# Patient Record
Sex: FEMALE | Race: BLACK OR AFRICAN AMERICAN | NOT HISPANIC OR LATINO | Employment: OTHER | URBAN - METROPOLITAN AREA
[De-identification: names, ages, dates, MRNs, and addresses within clinical notes are randomized per-mention and may not be internally consistent; named-entity substitution may affect disease eponyms.]

---

## 2017-07-23 ENCOUNTER — APPOINTMENT (OUTPATIENT)
Dept: RADIOLOGY | Facility: MEDICAL CENTER | Age: 62
End: 2017-07-23
Attending: EMERGENCY MEDICINE
Payer: COMMERCIAL

## 2017-07-23 ENCOUNTER — HOSPITAL ENCOUNTER (EMERGENCY)
Facility: MEDICAL CENTER | Age: 62
End: 2017-07-23
Attending: EMERGENCY MEDICINE
Payer: COMMERCIAL

## 2017-07-23 VITALS
HEART RATE: 59 BPM | TEMPERATURE: 99.2 F | RESPIRATION RATE: 11 BRPM | DIASTOLIC BLOOD PRESSURE: 81 MMHG | WEIGHT: 263.01 LBS | OXYGEN SATURATION: 65 % | SYSTOLIC BLOOD PRESSURE: 155 MMHG

## 2017-07-23 DIAGNOSIS — N30.00 ACUTE CYSTITIS WITHOUT HEMATURIA: ICD-10-CM

## 2017-07-23 DIAGNOSIS — K57.30 DIVERTICULOSIS OF LARGE INTESTINE WITHOUT HEMORRHAGE: ICD-10-CM

## 2017-07-23 LAB
ALBUMIN SERPL BCP-MCNC: 3.8 G/DL (ref 3.2–4.9)
ALBUMIN/GLOB SERPL: 1.3 G/DL
ALP SERPL-CCNC: 57 U/L (ref 30–99)
ALT SERPL-CCNC: 11 U/L (ref 2–50)
ANION GAP SERPL CALC-SCNC: 6 MMOL/L (ref 0–11.9)
APPEARANCE UR: CLEAR
APTT PPP: 29.4 SEC (ref 24.7–36)
AST SERPL-CCNC: 13 U/L (ref 12–45)
BACTERIA #/AREA URNS HPF: ABNORMAL /HPF
BASOPHILS # BLD AUTO: 0.7 % (ref 0–1.8)
BASOPHILS # BLD: 0.05 K/UL (ref 0–0.12)
BILIRUB SERPL-MCNC: 0.3 MG/DL (ref 0.1–1.5)
BILIRUB UR QL STRIP.AUTO: NEGATIVE
BNP SERPL-MCNC: 30 PG/ML (ref 0–100)
BUN SERPL-MCNC: 11 MG/DL (ref 8–22)
CALCIUM SERPL-MCNC: 8.9 MG/DL (ref 8.5–10.5)
CHLORIDE SERPL-SCNC: 108 MMOL/L (ref 96–112)
CO2 SERPL-SCNC: 27 MMOL/L (ref 20–33)
COLOR UR: YELLOW
CREAT SERPL-MCNC: 0.69 MG/DL (ref 0.5–1.4)
CULTURE IF INDICATED INDCX: YES UA CULTURE
EKG IMPRESSION: NORMAL
EOSINOPHIL # BLD AUTO: 0.23 K/UL (ref 0–0.51)
EOSINOPHIL NFR BLD: 3.3 % (ref 0–6.9)
EPI CELLS #/AREA URNS HPF: ABNORMAL /HPF
ERYTHROCYTE [DISTWIDTH] IN BLOOD BY AUTOMATED COUNT: 51.9 FL (ref 35.9–50)
GFR SERPL CREATININE-BSD FRML MDRD: >60 ML/MIN/1.73 M 2
GLOBULIN SER CALC-MCNC: 2.9 G/DL (ref 1.9–3.5)
GLUCOSE SERPL-MCNC: 95 MG/DL (ref 65–99)
GLUCOSE UR STRIP.AUTO-MCNC: NEGATIVE MG/DL
HCT VFR BLD AUTO: 36.3 % (ref 37–47)
HGB BLD-MCNC: 11.5 G/DL (ref 12–16)
IMM GRANULOCYTES # BLD AUTO: 0.02 K/UL (ref 0–0.11)
IMM GRANULOCYTES NFR BLD AUTO: 0.3 % (ref 0–0.9)
INR PPP: 1.1 (ref 0.87–1.13)
KETONES UR STRIP.AUTO-MCNC: NEGATIVE MG/DL
LEUKOCYTE ESTERASE UR QL STRIP.AUTO: ABNORMAL
LIPASE SERPL-CCNC: 19 U/L (ref 11–82)
LYMPHOCYTES # BLD AUTO: 2.95 K/UL (ref 1–4.8)
LYMPHOCYTES NFR BLD: 41.8 % (ref 22–41)
MCH RBC QN AUTO: 26.7 PG (ref 27–33)
MCHC RBC AUTO-ENTMCNC: 31.7 G/DL (ref 33.6–35)
MCV RBC AUTO: 84.2 FL (ref 81.4–97.8)
MICRO URNS: ABNORMAL
MONOCYTES # BLD AUTO: 0.52 K/UL (ref 0–0.85)
MONOCYTES NFR BLD AUTO: 7.4 % (ref 0–13.4)
MUCOUS THREADS #/AREA URNS HPF: ABNORMAL /HPF
NEUTROPHILS # BLD AUTO: 3.28 K/UL (ref 2–7.15)
NEUTROPHILS NFR BLD: 46.5 % (ref 44–72)
NITRITE UR QL STRIP.AUTO: POSITIVE
NRBC # BLD AUTO: 0 K/UL
NRBC BLD AUTO-RTO: 0 /100 WBC
PH UR STRIP.AUTO: 5.5 [PH]
PLATELET # BLD AUTO: 366 K/UL (ref 164–446)
PMV BLD AUTO: 10.1 FL (ref 9–12.9)
POTASSIUM SERPL-SCNC: 3.6 MMOL/L (ref 3.6–5.5)
PROT SERPL-MCNC: 6.7 G/DL (ref 6–8.2)
PROT UR QL STRIP: NEGATIVE MG/DL
PROTHROMBIN TIME: 14.6 SEC (ref 12–14.6)
RBC # BLD AUTO: 4.31 M/UL (ref 4.2–5.4)
RBC # URNS HPF: ABNORMAL /HPF
RBC UR QL AUTO: ABNORMAL
SODIUM SERPL-SCNC: 141 MMOL/L (ref 135–145)
SP GR UR STRIP.AUTO: 1.03
TROPONIN I SERPL-MCNC: <0.01 NG/ML (ref 0–0.04)
UROBILINOGEN UR STRIP.AUTO-MCNC: 1 MG/DL
WBC # BLD AUTO: 7.1 K/UL (ref 4.8–10.8)
WBC #/AREA URNS HPF: ABNORMAL /HPF

## 2017-07-23 PROCEDURE — A9270 NON-COVERED ITEM OR SERVICE: HCPCS | Performed by: EMERGENCY MEDICINE

## 2017-07-23 PROCEDURE — 74176 CT ABD & PELVIS W/O CONTRAST: CPT

## 2017-07-23 PROCEDURE — 87077 CULTURE AEROBIC IDENTIFY: CPT

## 2017-07-23 PROCEDURE — 83880 ASSAY OF NATRIURETIC PEPTIDE: CPT

## 2017-07-23 PROCEDURE — 80053 COMPREHEN METABOLIC PANEL: CPT

## 2017-07-23 PROCEDURE — 85025 COMPLETE CBC W/AUTO DIFF WBC: CPT

## 2017-07-23 PROCEDURE — 96372 THER/PROPH/DIAG INJ SC/IM: CPT

## 2017-07-23 PROCEDURE — 81001 URINALYSIS AUTO W/SCOPE: CPT

## 2017-07-23 PROCEDURE — 36415 COLL VENOUS BLD VENIPUNCTURE: CPT

## 2017-07-23 PROCEDURE — 93005 ELECTROCARDIOGRAM TRACING: CPT

## 2017-07-23 PROCEDURE — 87186 SC STD MICRODIL/AGAR DIL: CPT

## 2017-07-23 PROCEDURE — 71010 DX-CHEST-LIMITED (1 VIEW): CPT

## 2017-07-23 PROCEDURE — 99284 EMERGENCY DEPT VISIT MOD MDM: CPT

## 2017-07-23 PROCEDURE — 700102 HCHG RX REV CODE 250 W/ 637 OVERRIDE(OP): Performed by: EMERGENCY MEDICINE

## 2017-07-23 PROCEDURE — 87086 URINE CULTURE/COLONY COUNT: CPT

## 2017-07-23 PROCEDURE — 84484 ASSAY OF TROPONIN QUANT: CPT

## 2017-07-23 PROCEDURE — 85610 PROTHROMBIN TIME: CPT

## 2017-07-23 PROCEDURE — 85730 THROMBOPLASTIN TIME PARTIAL: CPT

## 2017-07-23 PROCEDURE — 83690 ASSAY OF LIPASE: CPT

## 2017-07-23 PROCEDURE — 700111 HCHG RX REV CODE 636 W/ 250 OVERRIDE (IP): Performed by: EMERGENCY MEDICINE

## 2017-07-23 RX ORDER — TRAMADOL HYDROCHLORIDE 50 MG/1
50 TABLET ORAL EVERY 4 HOURS PRN
Qty: 30 TAB | Refills: 0 | Status: SHIPPED | OUTPATIENT
Start: 2017-07-23

## 2017-07-23 RX ORDER — KETOROLAC TROMETHAMINE 30 MG/ML
30 INJECTION, SOLUTION INTRAMUSCULAR; INTRAVENOUS ONCE
Status: COMPLETED | OUTPATIENT
Start: 2017-07-23 | End: 2017-07-23

## 2017-07-23 RX ORDER — CEFDINIR 300 MG/1
300 CAPSULE ORAL 2 TIMES DAILY
Qty: 20 CAP | Refills: 0 | Status: SHIPPED | OUTPATIENT
Start: 2017-07-23 | End: 2017-08-02

## 2017-07-23 RX ORDER — CEFDINIR 300 MG/1
300 CAPSULE ORAL ONCE
Status: COMPLETED | OUTPATIENT
Start: 2017-07-23 | End: 2017-07-23

## 2017-07-23 RX ADMIN — KETOROLAC TROMETHAMINE 30 MG: 30 INJECTION, SOLUTION INTRAMUSCULAR; INTRAVENOUS at 16:52

## 2017-07-23 RX ADMIN — CEFDINIR 300 MG: 300 CAPSULE ORAL at 20:49

## 2017-07-23 ASSESSMENT — PAIN SCALES - GENERAL
PAINLEVEL_OUTOF10: 6
PAINLEVEL_OUTOF10: 8
PAINLEVEL_OUTOF10: 7

## 2017-07-23 ASSESSMENT — LIFESTYLE VARIABLES: DO YOU DRINK ALCOHOL: NO

## 2017-07-23 NOTE — ED AVS SNAPSHOT
7/23/2017    Susi Qiu  [address] [place] [address]    Dear Susi:    [facility] wants to ensure your discharge home is safe and you or your loved ones have had all of your questions answered regarding your care after you leave the hospital.    Below is a list of resources and contact information should you have any questions regarding your hospital stay, follow-up instructions, or active medical symptoms.    Questions or Concerns Regarding… Contact   Medical Questions Related to Your Discharge  (7 days a week, 8am-5pm) Contact a Nurse Care Coordinator   [phone]   Medical Questions Not Related to Your Discharge  (24 hours a day / 7 days a week)  Contact the Nurse Health Line   [phone]    Medications or Discharge Instructions Refer to your discharge packet   or contact your [facility] Primary Care Provider   [phone]   Follow-up Appointment(s) Schedule your appointment via Datto   or contact Scheduling [phone]   Billing Review your statement via Datto  or contact Billing [phone]   Medical Records Review your records via Datto   or contact Medical Records [phone]     You may receive a telephone call within two days of discharge. This call is to make certain you understand your discharge instructions and have the opportunity to have any questions answered. You can also easily access your medical information, test results and upcoming appointments via the Datto free online health management tool. You can learn more and sign up at [employer]/Datto. For assistance setting up your Datto account, please call [phone].    Once again, we want to ensure your discharge home is safe and that you have a clear understanding of any next steps in your care. If you have any questions or concerns, please do not hesitate to contact us, we are here for you. Thank you for choosing [facility] for your healthcare needs.    Sincerely,    Your [facility] Healthcare Team

## 2017-07-23 NOTE — ED NOTES
Amb to triage w/ c/o low abd pain, back pain radiating down her RLE x 3-4 days.  Pt also c/o substernal chest pain radiating down her RLE since this a.m.

## 2017-07-23 NOTE — ED AVS SNAPSHOT
Cloudbuild Access Code: [email or web address]  Expires: 8/22/2017  8:11 PM    Your email address is not on file at [facility].  Email Addresses are required for you to sign up for Cloudbuild, please contact [phone] to verify your personal information and to provide your email address prior to attempting to register for Cloudbuild[place] [address]    Cloudbuild  A secure, online tool to manage your health information     [facility]’s Cloudbuild® is a secure, online tool that connects you to your personalized health information from the privacy of your home -- day or night - making it very easy for you to manage your healthcare. Once the activation process is completed, you can even access your medical information using the Cloudbuild mahogany, which is available for free in the Apple Mahogany store or Google Play store.     To learn more about Cloudbuild, visit [email or web address]    There are two levels of access available (as shown below):   My Chart Features  [facility] Primary Care Doctor [facility]  Specialists [facility]  Urgent  Care Non-[facility] Primary Care Doctor   Email your healthcare team securely and privately 24/7 X X X    Manage appointments: schedule your next appointment; view details of past/upcoming appointments X      Request prescription refills. X      View recent personal medical records, including lab and immunizations X X X X   View health record, including health history, allergies, medications X X X X   Read reports about your outpatient visits, procedures, consult and ER notes X X X X   See your discharge summary, which is a recap of your hospital and/or ER visit that includes your diagnosis, lab results, and care plan X X  X     How to register for [employer]:  Once your e-mail address has been verified, follow the following steps to sign up for Cloudbuild.     1. Go to  [email or web address]  2. Click on the Sign Up Now box, which takes you to the New Member Sign Up page. You will  need to provide the following information:  a. Enter your [employer] Access Code exactly as it appears at the top of this page. (You will not need to use this code after you’ve completed the sign-up process. If you do not sign up before the expiration date, you must request a new code.)   b. Enter your date of birth.   c. Enter your home email address.   d. Click Submit, and follow the next screen’s instructions.  3. Create a [employer]t ID. This will be your [employer] login ID and cannot be changed, so think of one that is secure and easy to remember.  4. Create a [employer] password. You can change your password at any time.  5. Enter your Password Reset Question and Answer. This can be used at a later time if you forget your password.   6. Enter your e-mail address. This allows you to receive e-mail notifications when new information is available in [employer]  7. Click Sign Up. You can now view your health information.    For assistance activating your [employer] account, call [phone]

## 2017-07-23 NOTE — ED AVS SNAPSHOT
Home Care Instructions                                                                                                                Susi Qiu   MRN: [id number]    Department:  [facility], Emergency Dept   Date of Visit:  7/23/2017            [facility], Emergency Dept    [address] [place] [id number]    Phone:  [phone]      You were seen by     Ngozi Perez M.D.      Your Diagnosis Was     Acute cystitis without hematuria     N30.00       These are the medications you received during your hospitalization from 07/23/2017 1318 to 07/23/2017 2011     Date/Time Order Dose Route Action    07/23/2017 1652 ketorolac (TORADOL) injection 30 mg 30 mg Intramuscular Given      Follow-up Information     1. Follow up with [facility], Emergency Dept.    Specialty:  Emergency Medicine    Why:  Return for worsening pain, fever, vomiting or other concerns    Contact information    [address] [place] [address]  [place] [address] [phone]      Medication Information     Review all of your home medications and newly ordered medications with your primary doctor and/or pharmacist as soon as possible. Follow medication instructions as directed by your doctor and/or pharmacist.     Please keep your complete medication list with you and share with your physician. Update the information when medications are discontinued, doses are changed, or new medications (including over-the-counter products) are added; and carry medication information at all times in the event of emergency situations.               Medication List      START taking these medications        Instructions    Morning Afternoon Evening Bedtime    cefdinir 300 MG Caps   Commonly known as:  OMNICEF        Take 1 Cap by mouth 2 times a day for 10 days.   Dose:  300 mg                        tramadol 50 MG Tabs   Commonly known as:  ULTRAM        Take 1 Tab by mouth every four hours as needed.      Dose:  50 mg                             Where to Get Your Medications      You can get these medications from any pharmacy     Bring a paper prescription for each of these medications    - cefdinir 300 MG Caps  - tramadol 50 MG Tabs            Procedures and tests performed during your visit     Procedure/Test Number of Times Performed    APTT 1    Btype Natriuretic Peptide 1    CARDIAC MONITORING 1    CBC with Differential 1    CT-ABDOMEN-PELVIS W/O 1    Cardiac Monitoring 1    Complete Metabolic Panel (CMP) 1    DX-CHEST-LIMITED (1 VIEW) 1    EKG (ER) 2    ESTIMATED GFR 1    Lipase 1    Maintain O2 sats greater than 94% 1    O2 Protocol 1    Oxygen Therapy per Protocol 1    Prothrombin Time 1    Troponin 1    URINALYSIS,CULTURE IF INDICATED 1    URINE MICROSCOPIC (W/UA) 1        Discharge Instructions       Urinary Tract Infection  A urinary tract infection (UTI) can occur any place along the urinary tract. The tract includes the kidneys, ureters, bladder, and urethra. A type of germ called bacteria often causes a UTI. UTIs are often helped with antibiotic medicine.   HOME CARE   · If given, take antibiotics as told by your doctor. Finish them even if you start to feel better.  · Drink enough fluids to keep your pee (urine) clear or pale yellow.  · Avoid tea, drinks with caffeine, and bubbly (carbonated) drinks.  · Pee often. Avoid holding your pee in for a long time.  · Pee before and after having sex (intercourse).  · Wipe from front to back after you poop (bowel movement) if you are a woman. Use each tissue only once.  GET HELP RIGHT AWAY IF:   · You have back pain.  · You have lower belly (abdominal) pain.  · You have chills.  · You feel sick to your stomach (nauseous).  · You throw up (vomit).  · Your burning or discomfort with peeing does not go away.  · You have a fever.  · Your symptoms are not better in 3 days.  MAKE SURE YOU:   · Understand these instructions.  · Will watch your condition.  · Will get  help right away if you are not doing well or get worse.     This information is not intended to replace advice given to you by your health care provider. Make sure you discuss any questions you have with your health care provider.     Document Released: 06/05/2009 Document Revised: 01/08/2016 Document Reviewed: 07/18/2013  [employer] Interactive Patient Education ©2016 [employer]    Diverticulosis  Diverticulosis is a common condition that develops when small pouches (diverticula) form in the wall of the colon. The risk of diverticulosis increases with age. It happens more often in people who eat a low-fiber diet. Most individuals with diverticulosis have no symptoms. Those individuals with symptoms usually experience abdominal pain, constipation, or diarrhea.   Eating a high-fiber diet has been shown to reduce discomfort and other symptoms of diverticulosis. Fiber may also slow the progression of diverticulosis. Foods having high fiber content include:  · Baked beans, kidney beans, split peas, lentils.  · Bran cereals, shredded wheat, bran muffins, whole-grain breads.  · Fresh fruit, raisins, prunes.  · Potatoes (with skin), broccoli, spinach, zucchini.  You may feel a little bloated when you introduce more fiber into your diet. These symptoms usually pass in time. Drink enough water and fluids to keep your urine clear or pale yellow, to prevent constipation. Try not to strain when you have a bowel movement. Some caregivers may recommend avoiding nuts and seeds to prevent complications of diverticulosis.  Mild pain medicine may help soothe pain and spasms. Only take over-the-counter or prescription medicines for pain, discomfort, or fever as directed by your caregiver.  Some complications of diverticulosis include an infection of the diverticula (diverticulitis), rectal bleeding, or blockage of the colon (bowel obstruction).  SEEK IMMEDIATE MEDICAL CARE IF:   · You develop increasing pain or severe  bloating.  · You have an oral temperature above 102° F (38.9° C), not controlled by medicine.  · You develop vomiting or bowel movements that are bloody or black.  Document Released: 12/18/2006 Document Revised: 03/11/2013 Document Reviewed: 05/18/2011  ExitCare® Patient Information ©2014 [employer].            Patient Information     Patient Information    Following emergency treatment: all patient requiring follow-up care must return either to a private physician or a clinic if your condition worsens before you are able to obtain further medical attention, please return to the emergency room.     Billing Information    At [facility], we work to make the billing process streamlined for our patients.  Our Representatives are here to answer any questions you may have regarding your hospital bill.  If you have insurance coverage and have supplied your insurance information to us, we will submit a claim to your insurer on your behalf.  Should you have any questions regarding your bill, we can be reached online or by phone as follows:  Online: You are able pay your bills online or live chat with our representatives about any billing questions you may have. We are here to help Monday - Friday from 8:00am to 7:30pm and 9:00am - 12:00pm on Saturdays.  Please visit [email or web address]  for more information.   Phone:  [phone] or [phone]    Please note that your emergency physician, surgeon, pathologist, radiologist, anesthesiologist, and other specialists are not employed by [facility] and will therefore bill separately for their services.  Please contact them directly for any questions concerning their bills at the numbers below:     Emergency Physician Services:  [phone]  [facility]:  [phone]  [facility]:  [phone]  [facility]:  [phone]    1. Your final bill may vary from the amount quoted upon discharge  if all procedures are not complete at that time, or if your doctor has additional procedures of which we are not aware. You will receive an additional bill if you return to the Emergency Department at [facility] for suture removal regardless of the facility of which the sutures were placed.     2. Please arrange for settlement of this account at the emergency registration.    3. All self-pay accounts are due in full at the time of treatment.  If you are unable to meet this obligation then payment is expected within 4-5 days.     4. If you have had radiology studies (CT, X-ray, Ultrasound, MRI), you have received a preliminary result during your emergency department visit. Please contact the radiology department [phone] to receive a copy of your final result. Please discuss the Final result with your primary physician or with the follow up physician provided.     Crisis Hotline:  [place] Crisis Hotline:  2-774-KVXJKDY or 1-343.734.2745  [place] Crisis Hotline:    1-847.820.3830 or 146-210-1261         ED Discharge Follow Up Questions    1. In order to provide you with very good care, we would like to follow up with a phone call in the next few days.  May we have your permission to contact you?     YES /  NO    2. What is the best phone number to call you? (       )_____-__________    3. What is the best time to call you?      Morning  /  Afternoon  /  Evening                   Patient Signature:  ____________________________________________________________    Date:  ____________________________________________________________

## 2017-07-23 NOTE — ED PROVIDER NOTES
ED Provider Note    Scribed for Ngozi Perez M.D. by Isael Brown. 7/23/2017, 4:37 PM.    Primary care provider: No primary care provider on file.  Means of arrival: Walk In  History obtained from: Patient  History limited by: None    CHIEF COMPLAINT  Chief Complaint   Patient presents with   • Abdominal Pain     low x 3 days   • Back Pain     hx of sciatica   • Leg Pain   • Chest Pain   • Arm Pain     right       HPI  Susi Qiu is a 61 y.o. female who presents to the Emergency Department complaining of worsening chest pain, onset 3 days ago. Patient notes pain radiates to her neck, back, and right arm. No exacerbating or alleviating factors noted. Additionally, patient is complaining of lower abdominal pain which radiates to her back and buttocks. Pain is described as burning in nature. Per patient, she has noticed a bump on her buttocks. Patient notes history of chronic endometriosis but has not experienced any symptoms for over 30 years.  Patient states she has taken Toradol in the past with improvement. Denies recent fevers or chills.      REVIEW OF SYSTEMS  Pertinent positives include chest pain, neck pain, back pain, right arm pain, abdominal pain. Pertinent negatives include no fevers or chills. All other systems reviewed and negative.    PAST MEDICAL HISTORY  Chronic endometriosis.   Sciatica     SURGICAL HISTORY  patient denies any surgical history    SOCIAL HISTORY  Social History   Substance Use Topics   • Smoking status: Never Smoker    • Smokeless tobacco: None   • Alcohol Use: No      FAMILY HISTORY  Noncontributory     CURRENT MEDICATIONS  No current facility-administered medications on file prior to encounter.     No current outpatient prescriptions on file prior to encounter.       ALLERGIES  Allergies   Allergen Reactions   • Iodine        PHYSICAL EXAM  VITAL SIGNS: /81 mmHg  Pulse 60  Temp(Src) 37.3 °C (99.2 °F) (Temporal)  Resp 18  Wt 119.3 kg (263 lb 0.1 oz)  SpO2  95%  Constitutional: Alert in no apparent distress.  HENT: No signs of trauma, Bilateral external ears normal, Nose normal.   Eyes: Pupils are equal and reactive, Conjunctiva normal, Non-icteric.   Neck: Normal range of motion, No tenderness, Supple, No stridor.   Cardiovascular: Regular rate and rhythm, no murmurs.   Thorax & Lungs: Normal breath sounds, No respiratory distress, No wheezing, No chest tenderness.   Abdomen: Bowel sounds normal, Soft, Left Lower quadrant tenderness, No masses, No peritoneal signs.  : Normal external lesions  Skin: Warm, Dry, No erythema, No rash.   Back: No bony tenderness, No CVA tenderness.   Musculoskeletal:  No major deformities noted. Reproducible pain of right trapezius.   Neurologic: Alert, moving all extremities without difficulty, no focal deficits.    LABS  Results for orders placed or performed during the hospital encounter of 07/23/17   Troponin   Result Value Ref Range    Troponin I <0.01 0.00 - 0.04 ng/mL   Btype Natriuretic Peptide   Result Value Ref Range    B Natriuretic Peptide 30 0 - 100 pg/mL   Prothrombin Time   Result Value Ref Range    PT 14.6 12.0 - 14.6 sec    INR 1.10 0.87 - 1.13   APTT   Result Value Ref Range    APTT 29.4 24.7 - 36.0 sec   CBC with Differential   Result Value Ref Range    WBC 7.1 4.8 - 10.8 K/uL    RBC 4.31 4.20 - 5.40 M/uL    Hemoglobin 11.5 (L) 12.0 - 16.0 g/dL    Hematocrit 36.3 (L) 37.0 - 47.0 %    MCV 84.2 81.4 - 97.8 fL    MCH 26.7 (L) 27.0 - 33.0 pg    MCHC 31.7 (L) 33.6 - 35.0 g/dL    RDW 51.9 (H) 35.9 - 50.0 fL    Platelet Count 366 164 - 446 K/uL    MPV 10.1 9.0 - 12.9 fL    Neutrophils-Polys 46.50 44.00 - 72.00 %    Lymphocytes 41.80 (H) 22.00 - 41.00 %    Monocytes 7.40 0.00 - 13.40 %    Eosinophils 3.30 0.00 - 6.90 %    Basophils 0.70 0.00 - 1.80 %    Immature Granulocytes 0.30 0.00 - 0.90 %    Nucleated RBC 0.00 /100 WBC    Neutrophils (Absolute) 3.28 2.00 - 7.15 K/uL    Lymphs (Absolute) 2.95 1.00 - 4.80 K/uL    Monos  (Absolute) 0.52 0.00 - 0.85 K/uL    Eos (Absolute) 0.23 0.00 - 0.51 K/uL    Baso (Absolute) 0.05 0.00 - 0.12 K/uL    Immature Granulocytes (abs) 0.02 0.00 - 0.11 K/uL    NRBC (Absolute) 0.00 K/uL   Complete Metabolic Panel (CMP)   Result Value Ref Range    Sodium 141 135 - 145 mmol/L    Potassium 3.6 3.6 - 5.5 mmol/L    Chloride 108 96 - 112 mmol/L    Co2 27 20 - 33 mmol/L    Anion Gap 6.0 0.0 - 11.9    Glucose 95 65 - 99 mg/dL    Bun 11 8 - 22 mg/dL    Creatinine 0.69 0.50 - 1.40 mg/dL    Calcium 8.9 8.5 - 10.5 mg/dL    AST(SGOT) 13 12 - 45 U/L    ALT(SGPT) 11 2 - 50 U/L    Alkaline Phosphatase 57 30 - 99 U/L    Total Bilirubin 0.3 0.1 - 1.5 mg/dL    Albumin 3.8 3.2 - 4.9 g/dL    Total Protein 6.7 6.0 - 8.2 g/dL    Globulin 2.9 1.9 - 3.5 g/dL    A-G Ratio 1.3 g/dL   Lipase   Result Value Ref Range    Lipase 19 11 - 82 U/L   ESTIMATED GFR   Result Value Ref Range    GFR If African American >60 >60 mL/min/1.73 m 2    GFR If Non African American >60 >60 mL/min/1.73 m 2   URINALYSIS,CULTURE IF INDICATED   Result Value Ref Range    Color Yellow     Character Clear     Specific Gravity 1.029 <1.035    Ph 5.5 5.0-8.0    Glucose Negative Negative mg/dL    Ketones Negative Negative mg/dL    Protein Negative Negative mg/dL    Bilirubin Negative Negative    Urobilinogen, Urine 1.0 Negative    Nitrite Positive (A) Negative    Leukocyte Esterase Trace (A) Negative    Occult Blood Moderate (A) Negative    Micro Urine Req Microscopic     Culture Indicated Yes UA Culture   URINE MICROSCOPIC (W/UA)   Result Value Ref Range    WBC 5-10 (A) /hpf    RBC 5-10 (A) /hpf    Bacteria Many (A) None /hpf    Epithelial Cells Moderate (A) /hpf    Mucous Threads Few /hpf   EKG (ER)   Result Value Ref Range    Report       [facility] Emergency Dept.    Test Date:  2017-07-23  Pt Name:    SOHEILA GOEL              Department: ER  MRN:        [id number]                      Room:  Gender:     F                             Technician: [id number]  :        1955                   Requested By:ER TRIAGE PROTOCOL  Order #:    [id number]                    Reading MD:    Measurements  Intervals                                Axis  Rate:       60                           P:          34  GA:         176                          QRS:        33  QRSD:       90                           T:          10  QT:         408  QTc:        408    Interpretive Statements  SINUS RHYTHM  No previous ECG available for comparison         All labs reviewed by me.    EKG  12 Lead EKG interpreted by me to show:  Normal sinus rhythm  Rate 60  Axis: Normal  Intervals: Normal  Normal T waves  Normal ST segments  My impression of this EKG: Does not indicate ischemia or arrythmia at this time.    RADIOLOGY  CT-ABDOMEN-PELVIS W/O   Final Result      Moderate colonic diverticulosis without definite diverticulitis      Medium-sized pericardial effusion      Right renal simple cyst      Probable left adrenal gland mass most likely is an adenoma although it is too small to characterize      DX-CHEST-LIMITED (1 VIEW)   Final Result         Cardiomegaly and mild pulmonary edema.        The radiologist's interpretation of all radiological studies have been reviewed by me.    COURSE & MEDICAL DECISION MAKING  Pertinent Labs & Imaging studies reviewed. (See chart for details)    Differential diagnoses include but are not limited to: Diverticulitis, UTI, fibroid.     4:37 PM - Patient seen and examined at bedside. Patient will be treated with 30 mg Toradol Injection. Ordered abdomen-pelvis CT, chest x-ray, estimated GFR, CBC with differential, CMP, lipase, POC UA, troponin, BNP, PTT/APTT to evaluate her symptoms.     6:31 PM - Reviewed radiology results.     7:03 PM - Recheck patient. Patient informed diagnostic studies are normal at this time but still awaiting UA.     7:58 PM - Reviewed lab results showing evidence of UTI.     8:09 PM - Recheck patient. Patient informed  she has UTI and will be discharged home with antibiotics. Informed I consulted with scheduling to establish her with PCP.   /81 mmHg  Pulse 59  Temp(Src) 37.3 °C (99.2 °F) (Temporal)  Resp 11  Wt 119.3 kg (263 lb 0.1 oz)  SpO2 65%      Decision Making:  This is a 61 y.o. year old female who presents with abdominal pain. Patient is afebrile she is not tachycardic. Her labs are essentially normal. She does not have any evidence of heart failure she has a normal BNP. CT scan was performed and she is diverticulosis without diverticulitis. She has some cardiomegaly as well on her x-ray. Again she has a normal troponin and normal BNP I do not think she is in heart failure. She does have evidence of a UTI. Positive nitrates. She'll be started on antibiotics. Scheduling has been called for primary care follow-up. She will be discharged.    The patient will return for new or worsening symptoms and is stable at the time of discharge.    The patient is referred to a primary physician for blood pressure management, diabetic screening, and for all other preventative health concerns.    DISPOSITION:  Patient will be discharged home in stable condition.    FOLLOW UP:  [facility], Emergency Dept  [address] [place] [id number] [phone]    Return for worsening pain, fever, vomiting or other concerns      OUTPATIENT MEDICATIONS:  Discharge Medication List as of 7/23/2017  8:11 PM      START taking these medications    Details   cefdinir (OMNICEF) 300 MG Cap Take 1 Cap by mouth 2 times a day for 10 days., Disp-20 Cap, R-0, Print Rx Paper      tramadol (ULTRAM) 50 MG Tab Take 1 Tab by mouth every four hours as needed., Disp-30 Tab, R-0, Print Rx Paper           FINAL IMPRESSION  1. Acute cystitis without hematuria    2. Diverticulosis of large intestine without hemorrhage         Your blood pressure is elevated here in the emergency department. Please monitor your blood pressure over the  next several days. If your blood pressure continues to be 120/80 or higher please contact your physician for blood pressure management.     This dictation has been created using voice recognition software and/or scribes. The accuracy of the dictation is limited by the abilities of the software and the expertise of the scribes. I expect there may be some errors of grammar and possibly content. I made every attempt to manually correct the errors within my dictation. However, errors related to voice recognition software and/or scribes may still exist and should be interpreted within the appropriate context.     Isael TRUJILLO (Scribe), am scribing for, and in the presence of, Ngozi Perez M.D..    Electronically signed by: Isael Brown (Scribe), 7/23/2017    Ngozi TRUJILLO M.D. personally performed the services described in this documentation, as scribed by Isael Brown in my presence, and it is both accurate and complete.    The note accurately reflects work and decisions made by me.  Ngozi Perez  7/23/2017  11:29 PM

## 2017-07-23 NOTE — LETTER
7/27/2017               Susi Becerraville  [address] [place] [id number]        Dear Susi (MR#[id number])    This letter is sent in regards to your, recent visit to the [facility] Emergency Department on 7/23/2017.  During the visit, tests were performed to assist the physician in a medical diagnosis.  A review of those tests requires that we notify you of the following:    Your urine culture was POSITIVE for a bacteria called Klebsiella pneumoniae. The antibiotic prescribed for you (cefdinir) should be active to treat this bacteria. IT IS IMPORTANT THAT YOU CONTINUE TAKING YOUR ANTIBIOTIC UNTIL IT IS FINISHED.      Please feel free to contact me at the number below if you have any questions or concerns. Thank you for your cooperation in the matter.    Sincerely,  ED Culture Follow-Up Staff  Amber Garcia, PharmD    [facility], Emergency Department  [address] [place] [id number] [phone] (ED Culture Line)  [phone]

## 2017-07-24 ENCOUNTER — PATIENT OUTREACH (OUTPATIENT)
Dept: HEALTH INFORMATION MANAGEMENT | Facility: OTHER | Age: 62
End: 2017-07-24

## 2017-07-24 NOTE — ED NOTES
.Discharge instructions given to pt. Pt verbalized understanding. Questions answered. Pt given 2 prescription. Ambulatory to home.   Awaiting medication from pharmacy before discharge.

## 2017-07-24 NOTE — DISCHARGE INSTRUCTIONS
Urinary Tract Infection  A urinary tract infection (UTI) can occur any place along the urinary tract. The tract includes the kidneys, ureters, bladder, and urethra. A type of germ called bacteria often causes a UTI. UTIs are often helped with antibiotic medicine.   HOME CARE   · If given, take antibiotics as told by your doctor. Finish them even if you start to feel better.  · Drink enough fluids to keep your pee (urine) clear or pale yellow.  · Avoid tea, drinks with caffeine, and bubbly (carbonated) drinks.  · Pee often. Avoid holding your pee in for a long time.  · Pee before and after having sex (intercourse).  · Wipe from front to back after you poop (bowel movement) if you are a woman. Use each tissue only once.  GET HELP RIGHT AWAY IF:   · You have back pain.  · You have lower belly (abdominal) pain.  · You have chills.  · You feel sick to your stomach (nauseous).  · You throw up (vomit).  · Your burning or discomfort with peeing does not go away.  · You have a fever.  · Your symptoms are not better in 3 days.  MAKE SURE YOU:   · Understand these instructions.  · Will watch your condition.  · Will get help right away if you are not doing well or get worse.     This information is not intended to replace advice given to you by your health care provider. Make sure you discuss any questions you have with your health care provider.     Document Released: 06/05/2009 Document Revised: 01/08/2016 Document Reviewed: 07/18/2013  [employer] Patient Education ©2016 [employer]    Diverticulosis  Diverticulosis is a common condition that develops when small pouches (diverticula) form in the wall of the colon. The risk of diverticulosis increases with age. It happens more often in people who eat a low-fiber diet. Most individuals with diverticulosis have no symptoms. Those individuals with symptoms usually experience abdominal pain, constipation, or diarrhea.   Eating a high-fiber diet has been shown to reduce  discomfort and other symptoms of diverticulosis. Fiber may also slow the progression of diverticulosis. Foods having high fiber content include:  · Baked beans, kidney beans, split peas, lentils.  · Bran cereals, shredded wheat, bran muffins, whole-grain breads.  · Fresh fruit, raisins, prunes.  · Potatoes (with skin), broccoli, spinach, zucchini.  You may feel a little bloated when you introduce more fiber into your diet. These symptoms usually pass in time. Drink enough water and fluids to keep your urine clear or pale yellow, to prevent constipation. Try not to strain when you have a bowel movement. Some caregivers may recommend avoiding nuts and seeds to prevent complications of diverticulosis.  Mild pain medicine may help soothe pain and spasms. Only take over-the-counter or prescription medicines for pain, discomfort, or fever as directed by your caregiver.  Some complications of diverticulosis include an infection of the diverticula (diverticulitis), rectal bleeding, or blockage of the colon (bowel obstruction).  SEEK IMMEDIATE MEDICAL CARE IF:   · You develop increasing pain or severe bloating.  · You have an oral temperature above 102° F (38.9° C), not controlled by medicine.  · You develop vomiting or bowel movements that are bloody or black.  Document Released: 12/18/2006 Document Revised: 03/11/2013 Document Reviewed: 05/18/2011  [employer]® Patient Information ©2014 [employer].

## 2017-07-24 NOTE — ED NOTES
Report given by Elsa MITCHELL. Assumed care of pt. Pt update on poc. Ambulatory to restroom with walker for urine sample. Sample sent to lab.

## 2017-07-25 LAB
BACTERIA UR CULT: ABNORMAL
BACTERIA UR CULT: ABNORMAL
SIGNIFICANT IND 70042: ABNORMAL
SITE SITE: ABNORMAL
SOURCE SOURCE: ABNORMAL

## 2017-07-27 NOTE — ED NOTES
ED Positive Culture Follow-up/Notification Note:    Date: 7/27/17     Patient seen in the ED on 7/23/2017 for worsening chest pain radiating to the neck, back and right arm.   1. Acute cystitis without hematuria    2. Diverticulosis of large intestine without hemorrhage       Discharge Medication List as of 7/23/2017  8:11 PM      START taking these medications    Details   cefdinir (OMNICEF) 300 MG Cap Take 1 Cap by mouth 2 times a day for 10 days., Disp-20 Cap, R-0, Print Rx Paper      tramadol (ULTRAM) 50 MG Tab Take 1 Tab by mouth every four hours as needed., Disp-30 Tab, R-0, Print Rx Paper             Allergies: Iodine     Final cultures:   Results     Procedure Component Value Units Date/Time    URINE CULTURE(NEW) [[id number]]  (Abnormal)  (Susceptibility) Collected:  07/23/17 1918    Order Status:  Completed Specimen Information:  Urine Updated:  07/25/17 0836     Significant Indicator POS (POS)      Source UR      Site --      Urine Culture Mixed skin shaun 10-50,000 cfu/mL (A)      Urine Culture -- (A)      Result:        Klebsiella pneumoniae  >100,000 cfu/mL      Culture & Susceptibility     KLEBSIELLA PNEUMONIAE     Antibiotic Sensitivity Microscan Unit Status    Ampicillin Resistant >16 mcg/mL Final    Cefepime Sensitive <=8 mcg/mL Final    Cefotaxime Sensitive <=2 mcg/mL Final    Cefotetan Sensitive <=16 mcg/mL Final    Ceftazidime Sensitive <=1 mcg/mL Final    Ceftriaxone Sensitive <=8 mcg/mL Final    Cefuroxime Sensitive 8 mcg/mL Final    Cephalothin Sensitive <=8 mcg/mL Final    Ciprofloxacin Sensitive <=1 mcg/mL Final    Gentamicin Sensitive <=4 mcg/mL Final    Levofloxacin Sensitive <=2 mcg/mL Final    Nitrofurantoin Sensitive <=32 mcg/mL Final    Pip/Tazobactam Sensitive <=16 mcg/mL Final    Piperacillin Sensitive <=16 mcg/mL Final    Tigecycline Sensitive <=2 mcg/mL Final    Tobramycin Sensitive <=4 mcg/mL Final    Trimeth/Sulfa Sensitive <=2/38 mcg/mL Final                        URINALYSIS,CULTURE IF INDICATED [[id number]]  (Abnormal) Collected:  07/23/17 1918    Order Status:  Completed Specimen Information:  Urine Updated:  07/1955     Color Yellow      Character Clear      Specific Gravity 1.029      Ph 5.5      Glucose Negative mg/dL      Ketones Negative mg/dL      Protein Negative mg/dL      Bilirubin Negative      Urobilinogen, Urine 1.0      Nitrite Positive (A)      Leukocyte Esterase Trace (A)      Occult Blood Moderate (A)      Micro Urine Req Microscopic      Culture Indicated Yes UA Culture           Plan:   Appropriate antibiotic therapy prescribed. No changes required based upon culture result.  Sent letter to patient to notify of positive culture result and encourage compliance with prescribed antibiotics.     Amber Garcia

## 2017-08-20 ENCOUNTER — HOSPITAL ENCOUNTER (EMERGENCY)
Dept: HOSPITAL 8 - ED | Age: 62
Discharge: HOME | End: 2017-08-20
Payer: COMMERCIAL

## 2017-08-20 VITALS — BODY MASS INDEX: 45.04 KG/M2 | WEIGHT: 254.19 LBS | HEIGHT: 63 IN

## 2017-08-20 VITALS — DIASTOLIC BLOOD PRESSURE: 94 MMHG | SYSTOLIC BLOOD PRESSURE: 129 MMHG

## 2017-08-20 DIAGNOSIS — Z87.891: ICD-10-CM

## 2017-08-20 DIAGNOSIS — N20.1: Primary | ICD-10-CM

## 2017-08-20 LAB
BUN SERPL-MCNC: 7 MG/DL (ref 7–18)
HCT VFR BLD CALC: 41.6 % (ref 34.6–47.8)
HGB BLD-MCNC: 13.5 G/DL (ref 11.7–16.4)
PATH.CAST-FLAG: (no result)
SPERM-FLAG: (no result)
SRC-FLAG: (no result)
WBC # BLD AUTO: 9.1 X10^3/UL (ref 3.4–10)
XTAL-FLAG: (no result)
YLC-FLAG: (no result)

## 2017-08-20 PROCEDURE — 81001 URINALYSIS AUTO W/SCOPE: CPT

## 2017-08-20 PROCEDURE — 80048 BASIC METABOLIC PNL TOTAL CA: CPT

## 2017-08-20 PROCEDURE — 36415 COLL VENOUS BLD VENIPUNCTURE: CPT

## 2017-08-20 PROCEDURE — 96372 THER/PROPH/DIAG INJ SC/IM: CPT

## 2017-08-20 PROCEDURE — 82040 ASSAY OF SERUM ALBUMIN: CPT

## 2017-08-20 PROCEDURE — 85025 COMPLETE CBC W/AUTO DIFF WBC: CPT

## 2017-08-20 PROCEDURE — 99284 EMERGENCY DEPT VISIT MOD MDM: CPT

## 2017-09-23 ENCOUNTER — HOSPITAL ENCOUNTER (EMERGENCY)
Dept: HOSPITAL 8 - ED | Age: 62
Discharge: HOME | End: 2017-09-23
Payer: COMMERCIAL

## 2017-09-23 VITALS — HEIGHT: 68 IN | BODY MASS INDEX: 34.08 KG/M2 | WEIGHT: 224.87 LBS

## 2017-09-23 VITALS — DIASTOLIC BLOOD PRESSURE: 82 MMHG | SYSTOLIC BLOOD PRESSURE: 150 MMHG

## 2017-09-23 DIAGNOSIS — R31.9: ICD-10-CM

## 2017-09-23 DIAGNOSIS — R07.89: Primary | ICD-10-CM

## 2017-09-23 DIAGNOSIS — N39.0: ICD-10-CM

## 2017-09-23 LAB
AST SERPL-CCNC: 17 U/L (ref 15–37)
BUN SERPL-MCNC: 14 MG/DL (ref 7–18)
HCT VFR BLD CALC: 42.8 % (ref 34.6–47.8)
HGB BLD-MCNC: 13.7 G/DL (ref 11.7–16.4)
IS PT STATUS REG ER OR PRE ER?: YES
PATH.CAST-FLAG: (no result)
SPERM-FLAG: (no result)
SRC-FLAG: (no result)
WBC # BLD AUTO: 8.3 X10^3/UL (ref 3.4–10)
XTAL-FLAG: (no result)
YLC-FLAG: (no result)

## 2017-09-23 PROCEDURE — 87086 URINE CULTURE/COLONY COUNT: CPT

## 2017-09-23 PROCEDURE — 96374 THER/PROPH/DIAG INJ IV PUSH: CPT

## 2017-09-23 PROCEDURE — 83880 ASSAY OF NATRIURETIC PEPTIDE: CPT

## 2017-09-23 PROCEDURE — 80053 COMPREHEN METABOLIC PANEL: CPT

## 2017-09-23 PROCEDURE — 74176 CT ABD & PELVIS W/O CONTRAST: CPT

## 2017-09-23 PROCEDURE — 96375 TX/PRO/DX INJ NEW DRUG ADDON: CPT

## 2017-09-23 PROCEDURE — 99285 EMERGENCY DEPT VISIT HI MDM: CPT

## 2017-09-23 PROCEDURE — 87077 CULTURE AEROBIC IDENTIFY: CPT

## 2017-09-23 PROCEDURE — 85025 COMPLETE CBC W/AUTO DIFF WBC: CPT

## 2017-09-23 PROCEDURE — 84484 ASSAY OF TROPONIN QUANT: CPT

## 2017-09-23 PROCEDURE — 76770 US EXAM ABDO BACK WALL COMP: CPT

## 2017-09-23 PROCEDURE — 71020: CPT

## 2017-09-23 PROCEDURE — 81001 URINALYSIS AUTO W/SCOPE: CPT

## 2017-09-23 PROCEDURE — 96361 HYDRATE IV INFUSION ADD-ON: CPT

## 2017-09-23 PROCEDURE — 84145 PROCALCITONIN (PCT): CPT

## 2017-09-23 PROCEDURE — 83690 ASSAY OF LIPASE: CPT

## 2017-09-23 PROCEDURE — 87040 BLOOD CULTURE FOR BACTERIA: CPT

## 2017-09-23 PROCEDURE — 36415 COLL VENOUS BLD VENIPUNCTURE: CPT

## 2017-09-23 PROCEDURE — 93005 ELECTROCARDIOGRAM TRACING: CPT

## 2017-09-23 PROCEDURE — 83605 ASSAY OF LACTIC ACID: CPT

## 2017-09-23 PROCEDURE — 87186 SC STD MICRODIL/AGAR DIL: CPT

## 2017-09-23 PROCEDURE — 96372 THER/PROPH/DIAG INJ SC/IM: CPT

## 2017-10-08 ENCOUNTER — HOSPITAL ENCOUNTER (EMERGENCY)
Dept: HOSPITAL 8 - ED | Age: 62
Discharge: HOME | End: 2017-10-08
Payer: COMMERCIAL

## 2017-10-08 VITALS — BODY MASS INDEX: 45.17 KG/M2 | WEIGHT: 264.55 LBS | HEIGHT: 64 IN

## 2017-10-08 VITALS — SYSTOLIC BLOOD PRESSURE: 180 MMHG | DIASTOLIC BLOOD PRESSURE: 93 MMHG

## 2017-10-08 DIAGNOSIS — I11.0: ICD-10-CM

## 2017-10-08 DIAGNOSIS — R31.9: ICD-10-CM

## 2017-10-08 DIAGNOSIS — R30.0: ICD-10-CM

## 2017-10-08 DIAGNOSIS — R10.9: Primary | ICD-10-CM

## 2017-10-08 DIAGNOSIS — I50.9: ICD-10-CM

## 2017-10-08 DIAGNOSIS — Z91.041: ICD-10-CM

## 2017-10-08 LAB
AST SERPL-CCNC: 15 U/L (ref 15–37)
BUN SERPL-MCNC: 6 MG/DL (ref 7–18)
HCT VFR BLD CALC: 39.6 % (ref 34.6–47.8)
HGB BLD-MCNC: 12.9 G/DL (ref 11.7–16.4)
WBC # BLD AUTO: 8.3 X10^3/UL (ref 3.4–10)

## 2017-10-08 PROCEDURE — 96361 HYDRATE IV INFUSION ADD-ON: CPT

## 2017-10-08 PROCEDURE — 81001 URINALYSIS AUTO W/SCOPE: CPT

## 2017-10-08 PROCEDURE — 74176 CT ABD & PELVIS W/O CONTRAST: CPT

## 2017-10-08 PROCEDURE — 96374 THER/PROPH/DIAG INJ IV PUSH: CPT

## 2017-10-08 PROCEDURE — 85025 COMPLETE CBC W/AUTO DIFF WBC: CPT

## 2017-10-08 PROCEDURE — 96375 TX/PRO/DX INJ NEW DRUG ADDON: CPT

## 2017-10-08 PROCEDURE — 36415 COLL VENOUS BLD VENIPUNCTURE: CPT

## 2017-10-08 PROCEDURE — 99285 EMERGENCY DEPT VISIT HI MDM: CPT

## 2017-10-08 PROCEDURE — 83690 ASSAY OF LIPASE: CPT

## 2017-10-08 PROCEDURE — 80053 COMPREHEN METABOLIC PANEL: CPT

## 2017-10-08 PROCEDURE — 93005 ELECTROCARDIOGRAM TRACING: CPT

## 2021-03-03 DIAGNOSIS — Z23 NEED FOR VACCINATION: ICD-10-CM
